# Patient Record
Sex: OTHER/UNKNOWN | Race: WHITE | NOT HISPANIC OR LATINO | URBAN - METROPOLITAN AREA
[De-identification: names, ages, dates, MRNs, and addresses within clinical notes are randomized per-mention and may not be internally consistent; named-entity substitution may affect disease eponyms.]

---

## 2023-09-29 ENCOUNTER — APPOINTMENT (OUTPATIENT)
Dept: URBAN - METROPOLITAN AREA CLINIC 231 | Age: 78
Setting detail: DERMATOLOGY
End: 2023-09-30

## 2023-09-29 DIAGNOSIS — L57.0 ACTINIC KERATOSIS: ICD-10-CM

## 2023-09-29 PROBLEM — D48.5 NEOPLASM OF UNCERTAIN BEHAVIOR OF SKIN: Status: ACTIVE | Noted: 2023-09-29

## 2023-09-29 PROCEDURE — OTHER ADDITIONAL NOTES: OTHER

## 2023-09-29 PROCEDURE — OTHER COUNSELING: OTHER

## 2023-09-29 PROCEDURE — 11102 TANGNTL BX SKIN SINGLE LES: CPT

## 2023-09-29 PROCEDURE — OTHER PHOTODYNAMIC THERAPY COUNSELING: OTHER

## 2023-09-29 PROCEDURE — 99213 OFFICE O/P EST LOW 20 MIN: CPT | Mod: 25

## 2023-09-29 PROCEDURE — OTHER MIPS QUALITY: OTHER

## 2023-09-29 PROCEDURE — OTHER BIOPSY BY SHAVE METHOD: OTHER

## 2023-09-29 ASSESSMENT — LOCATION SIMPLE DESCRIPTION DERM: LOCATION SIMPLE: SCALP

## 2023-09-29 ASSESSMENT — LOCATION ZONE DERM: LOCATION ZONE: SCALP

## 2023-09-29 ASSESSMENT — LOCATION DETAILED DESCRIPTION DERM: LOCATION DETAILED: LEFT SUPERIOR PARIETAL SCALP

## 2023-09-29 NOTE — PROCEDURE: ADDITIONAL NOTES
Additional Notes: Due to number of AKs, recommended field treatment with PDT. Patient preferred to research procedure and will consider it in the future. \\n\\nLiquid Nitrogen Treatment- risks of crusting, scabbing, blistering, scarring, darker or lighter pigmentary change, recurrence, incomplete removal and infection. May take more than one treatment to resolve AK.
Render Risk Assessment In Note?: no
Detail Level: Simple

## 2023-10-10 ENCOUNTER — APPOINTMENT (OUTPATIENT)
Dept: URBAN - METROPOLITAN AREA CLINIC 231 | Age: 78
Setting detail: DERMATOLOGY
End: 2023-10-11

## 2023-10-10 PROBLEM — C44.319 BASAL CELL CARCINOMA OF SKIN OF OTHER PARTS OF FACE: Status: ACTIVE | Noted: 2023-10-10

## 2023-10-10 PROCEDURE — OTHER CONSULTATION FOR MOHS SURGERY: OTHER

## 2023-10-10 PROCEDURE — OTHER MOHS SURGERY: OTHER

## 2023-10-10 PROCEDURE — 17311 MOHS 1 STAGE H/N/HF/G: CPT

## 2023-10-10 PROCEDURE — 13132 CMPLX RPR F/C/C/M/N/AX/G/H/F: CPT

## 2023-10-10 NOTE — PROCEDURE: MOHS SURGERY
Patient called to let Roberto know that [facility] is going to prescribe patient more Hydrocodone for her fractured ankle that she is healing from. She wanted us to know since she is under a pain contract with Pain Management.     Melolabial Interpolation Flap Text: A decision was made to reconstruct the defect utilizing an interpolation axial flap and a staged reconstruction.  A telfa template was made of the defect.  This telfa template was then used to outline the melolabial interpolation flap.  The donor area for the pedicle flap was then injected with anesthesia.  The flap was excised through the skin and subcutaneous tissue down to the layer of the underlying musculature.  The pedicle flap was carefully excised within this deep plane to maintain its blood supply.  The edges of the donor site were undermined.   The donor site was closed in a primary fashion.  The pedicle was then rotated into position and sutured.  Once the tube was sutured into place, adequate blood supply was confirmed with blanching and refill.  The pedicle was then wrapped with xeroform gauze and dressed appropriately with a telfa and gauze bandage to ensure continued blood supply and protect the attached pedicle.
